# Patient Record
Sex: MALE | URBAN - METROPOLITAN AREA
[De-identification: names, ages, dates, MRNs, and addresses within clinical notes are randomized per-mention and may not be internally consistent; named-entity substitution may affect disease eponyms.]

---

## 2019-06-10 ENCOUNTER — INPATIENT (INPATIENT)
Facility: HOSPITAL | Age: 54
LOS: 1 days | Discharge: HOME | End: 2019-06-12
Attending: INTERNAL MEDICINE | Admitting: INTERNAL MEDICINE
Payer: MEDICAID

## 2019-06-10 VITALS
DIASTOLIC BLOOD PRESSURE: 63 MMHG | HEART RATE: 80 BPM | TEMPERATURE: 98 F | OXYGEN SATURATION: 100 % | RESPIRATION RATE: 18 BRPM | SYSTOLIC BLOOD PRESSURE: 146 MMHG

## 2019-06-10 LAB
ALBUMIN SERPL ELPH-MCNC: 4.8 G/DL — SIGNIFICANT CHANGE UP (ref 3.3–5)
ALP SERPL-CCNC: 81 U/L — SIGNIFICANT CHANGE UP (ref 40–120)
ALT FLD-CCNC: 22 U/L — SIGNIFICANT CHANGE UP (ref 4–41)
ANION GAP SERPL CALC-SCNC: 12 MMO/L — SIGNIFICANT CHANGE UP (ref 7–14)
APTT BLD: 33.2 SEC — SIGNIFICANT CHANGE UP (ref 27.5–36.3)
AST SERPL-CCNC: 18 U/L — SIGNIFICANT CHANGE UP (ref 4–40)
BILIRUB SERPL-MCNC: 0.2 MG/DL — SIGNIFICANT CHANGE UP (ref 0.2–1.2)
BUN SERPL-MCNC: 12 MG/DL — SIGNIFICANT CHANGE UP (ref 7–23)
CALCIUM SERPL-MCNC: 9.8 MG/DL — SIGNIFICANT CHANGE UP (ref 8.4–10.5)
CHLORIDE SERPL-SCNC: 104 MMOL/L — SIGNIFICANT CHANGE UP (ref 98–107)
CO2 SERPL-SCNC: 26 MMOL/L — SIGNIFICANT CHANGE UP (ref 22–31)
CREAT SERPL-MCNC: 1.04 MG/DL — SIGNIFICANT CHANGE UP (ref 0.5–1.3)
GLUCOSE SERPL-MCNC: 101 MG/DL — HIGH (ref 70–99)
HCT VFR BLD CALC: 39.2 % — SIGNIFICANT CHANGE UP (ref 39–50)
HGB BLD-MCNC: 12.5 G/DL — LOW (ref 13–17)
INR BLD: 0.94 — SIGNIFICANT CHANGE UP (ref 0.88–1.17)
MCHC RBC-ENTMCNC: 29.1 PG — SIGNIFICANT CHANGE UP (ref 27–34)
MCHC RBC-ENTMCNC: 31.9 % — LOW (ref 32–36)
MCV RBC AUTO: 91.2 FL — SIGNIFICANT CHANGE UP (ref 80–100)
NRBC # FLD: 0 K/UL — SIGNIFICANT CHANGE UP (ref 0–0)
PLATELET # BLD AUTO: 296 K/UL — SIGNIFICANT CHANGE UP (ref 150–400)
PMV BLD: 10.9 FL — SIGNIFICANT CHANGE UP (ref 7–13)
POTASSIUM SERPL-MCNC: 4.1 MMOL/L — SIGNIFICANT CHANGE UP (ref 3.5–5.3)
POTASSIUM SERPL-SCNC: 4.1 MMOL/L — SIGNIFICANT CHANGE UP (ref 3.5–5.3)
PROT SERPL-MCNC: 7.7 G/DL — SIGNIFICANT CHANGE UP (ref 6–8.3)
PROTHROM AB SERPL-ACNC: 10.7 SEC — SIGNIFICANT CHANGE UP (ref 9.8–13.1)
RBC # BLD: 4.3 M/UL — SIGNIFICANT CHANGE UP (ref 4.2–5.8)
RBC # FLD: 13.4 % — SIGNIFICANT CHANGE UP (ref 10.3–14.5)
SODIUM SERPL-SCNC: 142 MMOL/L — SIGNIFICANT CHANGE UP (ref 135–145)
TROPONIN T, HIGH SENSITIVITY: 11 NG/L — SIGNIFICANT CHANGE UP (ref ?–14)
WBC # BLD: 12.14 K/UL — HIGH (ref 3.8–10.5)
WBC # FLD AUTO: 12.14 K/UL — HIGH (ref 3.8–10.5)

## 2019-06-10 PROCEDURE — 71045 X-RAY EXAM CHEST 1 VIEW: CPT | Mod: 26

## 2019-06-10 RX ORDER — FAMOTIDINE 10 MG/ML
20 INJECTION INTRAVENOUS ONCE
Refills: 0 | Status: COMPLETED | OUTPATIENT
Start: 2019-06-10 | End: 2019-06-10

## 2019-06-10 RX ORDER — ONDANSETRON 8 MG/1
4 TABLET, FILM COATED ORAL ONCE
Refills: 0 | Status: COMPLETED | OUTPATIENT
Start: 2019-06-10 | End: 2019-06-10

## 2019-06-10 RX ORDER — ASPIRIN/CALCIUM CARB/MAGNESIUM 324 MG
162 TABLET ORAL ONCE
Refills: 0 | Status: COMPLETED | OUTPATIENT
Start: 2019-06-10 | End: 2019-06-10

## 2019-06-10 RX ORDER — SIMETHICONE 80 MG/1
80 TABLET, CHEWABLE ORAL ONCE
Refills: 0 | Status: COMPLETED | OUTPATIENT
Start: 2019-06-10 | End: 2019-06-10

## 2019-06-10 RX ADMIN — Medication 162 MILLIGRAM(S): at 21:53

## 2019-06-10 RX ADMIN — ONDANSETRON 4 MILLIGRAM(S): 8 TABLET, FILM COATED ORAL at 21:53

## 2019-06-10 RX ADMIN — FAMOTIDINE 20 MILLIGRAM(S): 10 INJECTION INTRAVENOUS at 21:56

## 2019-06-10 RX ADMIN — SIMETHICONE 80 MILLIGRAM(S): 80 TABLET, CHEWABLE ORAL at 21:56

## 2019-06-10 RX ADMIN — Medication 30 MILLILITER(S): at 21:56

## 2019-06-10 NOTE — ED PROVIDER NOTE - OBJECTIVE STATEMENT
54 M w/ HTN, T2DM, and HLD p/w substernal/L sided chest pain that started 2 hours ago. Pain is burning in nature, not associated w/ meals. Unclear if worse w/ exertion, no radiation. Had similar pain >15 years ago and got medical check up and was told his heart is fine. Currently reports pain is 7/10. No SOB, cough, palpitations, vomiting, diarrhea, or constipation. No recent travel. Active smoker, 2-3 cigs/day for ~40 years. 54 yr old M w/ HTN, T2DM, and HLD p/w substernal/L sided chest pain that started 2 hours ago. Pain is burning in nature, not associated w/ meals. Unclear if worse w/ exertion, no radiation. Had similar pain >15 years ago and got medical check up and was told his heart is fine. Currently reports pain is 7/10. No SOB, cough, palpitations, vomiting, diarrhea, or constipation. No recent travel. Active smoker, 2-3 cigs/day for ~40 years.  Well appearing.

## 2019-06-10 NOTE — ED PROVIDER NOTE - NS ED ROS FT
CONSTITUTIONAL:  No fever, chills, weakness or fatigue.  HEENT:  No rhinorrhea  SKIN:  No rash or itching.  CARDIOVASCULAR:  +chest pain  RESPIRATORY:  No shortness of breath, cough or sputum.  GASTROINTESTINAL:  No nausea, vomiting or diarrhea. No abdominal pain.   GENITOURINARY:  Denies hematuria, dysuria.   NEUROLOGICAL:  No headache, dizziness, syncope.   MUSCULOSKELETAL:  No muscle, back pain, joint pain or stiffness.  HEMATOLOGIC:  No bleeding or bruising.

## 2019-06-10 NOTE — ED ADULT NURSE NOTE - OBJECTIVE STATEMENT
Pt received to rm 21 aaox4 ambulatory c/o lesft sided chest pain x 2 hrs Pt denies SOB numbness tingling left arm pain.  Pt p/w NAD breatsh even unlabored skin warm dry intact 20 g IV access obtained at . labs and meds as ordered.  Will monitor.

## 2019-06-10 NOTE — ED PROVIDER NOTE - CLINICAL SUMMARY MEDICAL DECISION MAKING FREE TEXT BOX
54M w/ cardiac risk factors p/w atypical chest pain. EKG NSR, no ST/T wave changes. Will send labs, may need admission for stress test as he does not have cardiologist. 54M, smoker, w/ cardiac risk factors p/w atypical chest pain. EKG NSR, no ST/T wave changes. Will send labs, may need require CDU for stress and further eval as pt has limited f/u.

## 2019-06-10 NOTE — ED ADULT NURSE NOTE - NSIMPLEMENTINTERV_GEN_ALL_ED
Implemented All Universal Safety Interventions:  [place] to call system. Call bell, personal items and telephone within reach. Instruct patient to call for assistance. Room bathroom lighting operational. Non-slip footwear when patient is off stretcher. Physically safe environment: no spills, clutter or unnecessary equipment. Stretcher in lowest position, wheels locked, appropriate side rails in place.

## 2019-06-10 NOTE — ED PROVIDER NOTE - ATTENDING CONTRIBUTION TO CARE
Attending Attestation: Dr. Preston  I have personally performed a history and physical examination of the patient and discussed management with the resident as well as the patient.  I reviewed the resident's note and agree with the documented findings and plan of care.  I have authored and modified critical sections of the Provider Note, including but not limited to HPI, Physical Exam and MDM. 54M, smoker, w/ cardiac risk factors p/w atypical chest pain. EKG NSR, no ST/T wave changes. Will send labs, may need require CDU for stress and further eval as pt has limited f/u.

## 2019-06-10 NOTE — ED PROVIDER NOTE - PHYSICAL EXAMINATION
GENERAL APPEARANCE: NAD  HEENT:  NC/AT, clear conjunctiva  NECK: Neck supple, non-tender without lymphadenopathy, masses  CARDIAC: Normal S1 and S2. No S3, S4 or murmurs. Rhythm is regular.  LUNGS: Clear to auscultation and percussion without rales, rhonchi, wheezing or diminished breath sounds.  ABDOMEN: Soft, nondistended, nontender. No guarding or rebound.   MUSKULOSKELETAL: No joint erythema or tenderness.   EXTREMITIES: No edema.  NEUROLOGICAL: No focal neurologic deficit.   SKIN: Skin clean, dry, intact  PSYCHIATRIC: Normal affect and behavior.

## 2019-06-11 DIAGNOSIS — E78.5 HYPERLIPIDEMIA, UNSPECIFIED: ICD-10-CM

## 2019-06-11 DIAGNOSIS — F17.210 NICOTINE DEPENDENCE, CIGARETTES, UNCOMPLICATED: ICD-10-CM

## 2019-06-11 DIAGNOSIS — I10 ESSENTIAL (PRIMARY) HYPERTENSION: ICD-10-CM

## 2019-06-11 DIAGNOSIS — E11.9 TYPE 2 DIABETES MELLITUS WITHOUT COMPLICATIONS: ICD-10-CM

## 2019-06-11 DIAGNOSIS — R07.9 CHEST PAIN, UNSPECIFIED: ICD-10-CM

## 2019-06-11 DIAGNOSIS — R94.39 ABNORMAL RESULT OF OTHER CARDIOVASCULAR FUNCTION STUDY: ICD-10-CM

## 2019-06-11 DIAGNOSIS — I25.9 CHRONIC ISCHEMIC HEART DISEASE, UNSPECIFIED: ICD-10-CM

## 2019-06-11 DIAGNOSIS — Z29.9 ENCOUNTER FOR PROPHYLACTIC MEASURES, UNSPECIFIED: ICD-10-CM

## 2019-06-11 LAB
CHOLEST SERPL-MCNC: 95 MG/DL — LOW (ref 120–199)
HDLC SERPL-MCNC: 33 MG/DL — LOW (ref 35–55)
LIPID PNL WITH DIRECT LDL SERPL: 54 MG/DL — SIGNIFICANT CHANGE UP
TRIGL SERPL-MCNC: 129 MG/DL — SIGNIFICANT CHANGE UP (ref 10–149)
TROPONIN T, HIGH SENSITIVITY: 12 NG/L — SIGNIFICANT CHANGE UP (ref ?–14)

## 2019-06-11 PROCEDURE — 93016 CV STRESS TEST SUPVJ ONLY: CPT | Mod: GC

## 2019-06-11 PROCEDURE — 78452 HT MUSCLE IMAGE SPECT MULT: CPT | Mod: 26

## 2019-06-11 PROCEDURE — 93018 CV STRESS TEST I&R ONLY: CPT | Mod: GC

## 2019-06-11 RX ORDER — ASPIRIN/CALCIUM CARB/MAGNESIUM 324 MG
81 TABLET ORAL DAILY
Refills: 0 | Status: DISCONTINUED | OUTPATIENT
Start: 2019-06-11 | End: 2019-06-12

## 2019-06-11 RX ORDER — SODIUM CHLORIDE 9 MG/ML
1000 INJECTION, SOLUTION INTRAVENOUS
Refills: 0 | Status: DISCONTINUED | OUTPATIENT
Start: 2019-06-11 | End: 2019-06-12

## 2019-06-11 RX ORDER — HEPARIN SODIUM 5000 [USP'U]/ML
5000 INJECTION INTRAVENOUS; SUBCUTANEOUS EVERY 12 HOURS
Refills: 0 | Status: DISCONTINUED | OUTPATIENT
Start: 2019-06-11 | End: 2019-06-12

## 2019-06-11 RX ORDER — NICOTINE POLACRILEX 2 MG
1 GUM BUCCAL DAILY
Refills: 0 | Status: DISCONTINUED | OUTPATIENT
Start: 2019-06-11 | End: 2019-06-12

## 2019-06-11 RX ORDER — METFORMIN HYDROCHLORIDE 850 MG/1
1 TABLET ORAL
Qty: 0 | Refills: 0 | DISCHARGE

## 2019-06-11 RX ORDER — DEXTROSE 50 % IN WATER 50 %
15 SYRINGE (ML) INTRAVENOUS ONCE
Refills: 0 | Status: DISCONTINUED | OUTPATIENT
Start: 2019-06-11 | End: 2019-06-12

## 2019-06-11 RX ORDER — DEXTROSE 50 % IN WATER 50 %
12.5 SYRINGE (ML) INTRAVENOUS ONCE
Refills: 0 | Status: DISCONTINUED | OUTPATIENT
Start: 2019-06-11 | End: 2019-06-12

## 2019-06-11 RX ORDER — GLUCAGON INJECTION, SOLUTION 0.5 MG/.1ML
1 INJECTION, SOLUTION SUBCUTANEOUS ONCE
Refills: 0 | Status: DISCONTINUED | OUTPATIENT
Start: 2019-06-11 | End: 2019-06-12

## 2019-06-11 RX ORDER — DEXTROSE 50 % IN WATER 50 %
25 SYRINGE (ML) INTRAVENOUS ONCE
Refills: 0 | Status: DISCONTINUED | OUTPATIENT
Start: 2019-06-11 | End: 2019-06-12

## 2019-06-11 RX ORDER — FENOFIBRATE,MICRONIZED 130 MG
1 CAPSULE ORAL
Qty: 0 | Refills: 0 | DISCHARGE

## 2019-06-11 RX ORDER — INSULIN LISPRO 100/ML
VIAL (ML) SUBCUTANEOUS AT BEDTIME
Refills: 0 | Status: DISCONTINUED | OUTPATIENT
Start: 2019-06-11 | End: 2019-06-12

## 2019-06-11 RX ORDER — ASPIRIN/CALCIUM CARB/MAGNESIUM 324 MG
1 TABLET ORAL
Qty: 0 | Refills: 0 | DISCHARGE

## 2019-06-11 RX ORDER — METFORMIN HYDROCHLORIDE 850 MG/1
1000 TABLET ORAL
Refills: 0 | Status: DISCONTINUED | OUTPATIENT
Start: 2019-06-11 | End: 2019-06-11

## 2019-06-11 RX ORDER — ATORVASTATIN CALCIUM 80 MG/1
1 TABLET, FILM COATED ORAL
Qty: 0 | Refills: 0 | DISCHARGE

## 2019-06-11 RX ORDER — INSULIN LISPRO 100/ML
VIAL (ML) SUBCUTANEOUS
Refills: 0 | Status: DISCONTINUED | OUTPATIENT
Start: 2019-06-11 | End: 2019-06-12

## 2019-06-11 RX ORDER — ATORVASTATIN CALCIUM 80 MG/1
40 TABLET, FILM COATED ORAL
Refills: 0 | Status: DISCONTINUED | OUTPATIENT
Start: 2019-06-11 | End: 2019-06-12

## 2019-06-11 RX ORDER — ERGOCALCIFEROL 1.25 MG/1
1 CAPSULE ORAL
Qty: 0 | Refills: 0 | DISCHARGE

## 2019-06-11 RX ORDER — FENOFIBRATE,MICRONIZED 130 MG
48 CAPSULE ORAL
Refills: 0 | Status: DISCONTINUED | OUTPATIENT
Start: 2019-06-11 | End: 2019-06-12

## 2019-06-11 RX ORDER — LOSARTAN POTASSIUM 100 MG/1
50 TABLET, FILM COATED ORAL
Refills: 0 | Status: DISCONTINUED | OUTPATIENT
Start: 2019-06-11 | End: 2019-06-12

## 2019-06-11 RX ADMIN — METFORMIN HYDROCHLORIDE 1000 MILLIGRAM(S): 850 TABLET ORAL at 18:35

## 2019-06-11 RX ADMIN — LOSARTAN POTASSIUM 50 MILLIGRAM(S): 100 TABLET, FILM COATED ORAL at 18:35

## 2019-06-11 RX ADMIN — Medication 81 MILLIGRAM(S): at 12:49

## 2019-06-11 RX ADMIN — ATORVASTATIN CALCIUM 40 MILLIGRAM(S): 80 TABLET, FILM COATED ORAL at 18:35

## 2019-06-11 RX ADMIN — METFORMIN HYDROCHLORIDE 1000 MILLIGRAM(S): 850 TABLET ORAL at 09:31

## 2019-06-11 RX ADMIN — Medication 48 MILLIGRAM(S): at 18:35

## 2019-06-11 NOTE — H&P ADULT - HISTORY OF PRESENT ILLNESS
53 y/o M with hx of HTN, DM, HLD presents with chest pain which occurred yesterday. patient states he developed L sided, constant non radiating chest pain which lasted for 6-7 hour prior to arriving to ED with associated nausea, but no vomiting. Patient received Maalox Pepcid and Zofran in ED and pain improved s/p medications. He was then kept in CDU and underwent stress test, which was positive. He was admitted for further eval. Denies fever, chills, cough, falls, LOC, abdominal pain, SOB, nausea, vomiting, melena, hematochezia, LE edema or dysuria.

## 2019-06-11 NOTE — H&P ADULT - ASSESSMENT
53 y/o M with hx of HTN, DM, HLD presents with chest pain which occurred yesterday admitted for abnormal stress test

## 2019-06-11 NOTE — H&P ADULT - ATTENDING COMMENTS
Pt. seen and examined at bedside; agree with above     EKG NSR    NST: < from: Nuclear Stress Test-Exercise (06.11.19 @ 08:57) >    * Myocardial Perfusion SPECT results are abnormal.  * Review of raw data shows: The study is of good technical  quality.  * The left ventricle was normal in size. There is a small,  mild defect in basal anteroseptal wall that is reversible,  suggestive of mild ischemia.  * Post-stress gated wall motion analysis was performed  (LVEF = 60 %;LVEDV = 83 ml.), revealing normal LV  function. There was no segmental wall motion abnormality.  RV function appeared normal.    < end of copied text >    Assessment and Plan     1) CP: Stress test abnormal , will plan for angiogram, npo after midnight, c/w asa lipitor     2) HTN: decrease losartan to 25 mg po daily and start metoprolol XL 25mg po daily      3) DM: C/W insulin     4) DVT PPX Heparin

## 2019-06-11 NOTE — ED CDU PROVIDER INITIAL DAY NOTE - ATTENDING CONTRIBUTION TO CARE
ED Attending (Dr Preston): I have personally performed a face to face bedside history and physical examination of this patient.  I have discussed the case with the PA and  I have personally authored the following components: HPI, ROS, Physical Exam and MDM in addition to reviewing and revising the rest of the Provider Note. Chest pain in pt c moderate risk for MACE. Tele monitoring, stress test, general observation care / monitoring.

## 2019-06-11 NOTE — ED CDU PROVIDER DISPOSITION NOTE - CLINICAL COURSE
CDU Att Admit Note: Mohan  Pt p/w chest pain, today found to have abnormal stress test.  Admitted at [facility] of cardiology for further eval.  Pain is better controlled now and pt is well appearing, ambulatory.  All results and plan d/w pt via family and also via [facility]  (by LOR Li).   I have personally performed a face to face evaluation of this patient including review of the history and focused physical exam.  I have discussed the case and reviewed the plan of care with the PA.

## 2019-06-11 NOTE — H&P ADULT - NSICDXPASTMEDICALHX_GEN_ALL_CORE_FT
PAST MEDICAL HISTORY:  Cigarette smoker     Diabetes mellitus     Essential hypertension     Hyperlipidemia

## 2019-06-11 NOTE — H&P ADULT - PROBLEM SELECTOR PLAN 1
Admit to tele  check cbc, bmp, a1c, flp, tsh, trend CE  Echo ordered  NPO for cath in AM with Dr. Shaffer Admit to tele  check cbc, bmp, a1c, flp, tsh, trend CE  Echo ordered  NPO for cath in AM with Dr. Shaffer  Discussed with Dr. Shaffer

## 2019-06-11 NOTE — ED CDU PROVIDER INITIAL DAY NOTE - MEDICAL DECISION MAKING DETAILS
Tele monitoring, stress test, general observation care / monitoring. Chest pain in pt c moderate risk for MACE. Tele monitoring, stress test, general observation care / monitoring.

## 2019-06-11 NOTE — H&P ADULT - PROBLEM SELECTOR PLAN 2
Admit to tele  check cbc, bmp, a1c, flp, tsh, trend CE  Echo ordered  NPO for cath in AM with Dr. Denea obrien asa

## 2019-06-11 NOTE — ED CDU PROVIDER INITIAL DAY NOTE - OBJECTIVE STATEMENT
54 yr old M w/ HTN, T2DM, and HLD p/w substernal/L sided chest pain that started 2 hours ago. Pain is burning in nature, not associated w/ meals. Unclear if worse w/ exertion, no radiation. Had similar pain >15 years ago and got medical check up and was told his heart is fine. Currently reports pain is 7/10. No SOB, cough, palpitations, vomiting, diarrhea, or constipation. No recent travel. Active smoker, 2-3 cigs/day for ~40 years.  Well appearing.    CDU LOR Roper Note-----  53 yo Occ[place] SPEAKING male, PMH of DM, HTN, hyperlipidemia, active cigarette smoker (2-3 cigarettes per day x 40+ years); presented to the ED for chest pain as per above.  Pt. was evaluated in the ED; EKG x 2 with no acute findings; trop x 2: 11 -----> 12; CXR without acute pathology noted; pt was dispo'd to CDU for continued care plan:  Tele monitoring, stress test, general observation care / monitoring.  On CDU evaluation, H&P aided by [employer]  ID# [id number]; via  pt verbalizes feeling much improved; states no active c/o.  CDU care plan d/w pt; pt verbalizes agreement with plan. 54 yr old M w/ HTN, T2DM, and HLD p/w substernal/L sided chest pain that started 2 prior to arrival in ED. Pain is burning in nature, not associated w/ meals. Unclear if worse w/ exertion, no radiation. Had similar pain >15 years ago and got medical check up and was told his heart is fine. Currently reports pain is 7/10. No SOB, cough, palpitations, vomiting, diarrhea, or constipation. No recent travel. Active smoker, 2-3 cigs/day for ~40 years.  Well appearing.  In ED no source found and sent ot CDU for stress test and tele monitoring as pt would have some troubel obtaining outpt eval.     CDU LOR Roper Note-----  55 yo [place] SPEAKING male, PMH of DM, HTN, hyperlipidemia, active cigarette smoker (2-3 cigarettes per day x 40+ years); presented to the ED for chest pain as per above.  Pt. was evaluated in the ED; EKG x 2 with no acute findings; trop x 2: 11 -----> 12; CXR without acute pathology noted; pt was dispo'd to CDU for continued care plan:  Tele monitoring, stress test, general observation care / monitoring.  On CDU evaluation, H&P aided by [employer] Ramandeep  ID# [id number]; via  pt verbalizes feeling much improved; states no active c/o.  CDU care plan d/w pt; pt verbalizes agreement with plan.

## 2019-06-11 NOTE — ED CDU PROVIDER INITIAL DAY NOTE - PROGRESS NOTE DETAILS
Pt objectively noted to be resting comfortably.  Pt. will be signed out to the day CDU PA (Gabi) and attending (Dr. Preston) at 0700 hrs. CDU Att PN: Mohan  Feeling well, had stress this am, results pending.   I have personally performed a face to face evaluation of this patient including review of the history and focused physical exam.  I have discussed the case and reviewed the plan of care with the PA. Received call from Dr. Franz that stress test appears abnormal with mild ischemia noted anteroseptal region. Spoke to Dr. Shaffer regarding results and states to admit patient to his service. Spoke to patient w/  # [id number] regarding test results, recommendation for admission and possible need for cardiac cath. Son then came to bedside and aware of plan. Pt agreeable to stay. Will admit at this time. Tele PA paged.

## 2019-06-11 NOTE — H&P ADULT - NSHPLABSRESULTS_GEN_ALL_CORE
EKG: NSR 78 Flat T in V6, AVL, TWI in AVR. Tall T in v4                          12.5   12.14 )-----------( 296      ( 10 Christian 2019 21:49 )             39.2     06-10    142  |  104  |  12  ----------------------------<  101<H>  4.1   |  26  |  1.04    Ca    9.8      10 Christian 2019 21:49    TPro  7.7  /  Alb  4.8  /  TBili  0.2  /  DBili  x   /  AST  18  /  ALT  22  /  AlkPhos  81  06-10    Troponin T, High Sensitivity: 12: ---------------------***PLEASE NOTE***----------------------  Rapid changes upward or downward in high-sensitivity  troponin levels strongly suggest acute myocardial injury.  Hemolysis may falsely lower results. Renal impairment may  increase results.    Normal: <6 - 14 ng/L  Indeterminate: 15 - 51 ng/L  Elevated: >51 ng/L    Please see "[email or web address]" on the [employer]  intranet for more information. ng/L (06.10.19 @ 23:51)

## 2019-06-12 VITALS
DIASTOLIC BLOOD PRESSURE: 76 MMHG | OXYGEN SATURATION: 100 % | RESPIRATION RATE: 16 BRPM | HEART RATE: 70 BPM | SYSTOLIC BLOOD PRESSURE: 116 MMHG | TEMPERATURE: 98 F

## 2019-06-12 LAB
ANION GAP SERPL CALC-SCNC: 14 MMO/L — SIGNIFICANT CHANGE UP (ref 7–14)
BASOPHILS # BLD AUTO: 0.08 K/UL — SIGNIFICANT CHANGE UP (ref 0–0.2)
BASOPHILS NFR BLD AUTO: 0.9 % — SIGNIFICANT CHANGE UP (ref 0–2)
BUN SERPL-MCNC: 14 MG/DL — SIGNIFICANT CHANGE UP (ref 7–23)
CALCIUM SERPL-MCNC: 9.1 MG/DL — SIGNIFICANT CHANGE UP (ref 8.4–10.5)
CHLORIDE SERPL-SCNC: 104 MMOL/L — SIGNIFICANT CHANGE UP (ref 98–107)
CO2 SERPL-SCNC: 22 MMOL/L — SIGNIFICANT CHANGE UP (ref 22–31)
CREAT SERPL-MCNC: 0.98 MG/DL — SIGNIFICANT CHANGE UP (ref 0.5–1.3)
EOSINOPHIL # BLD AUTO: 0.49 K/UL — SIGNIFICANT CHANGE UP (ref 0–0.5)
EOSINOPHIL NFR BLD AUTO: 5.6 % — SIGNIFICANT CHANGE UP (ref 0–6)
GLUCOSE SERPL-MCNC: 129 MG/DL — HIGH (ref 70–99)
HCT VFR BLD CALC: 43.3 % — SIGNIFICANT CHANGE UP (ref 39–50)
HGB BLD-MCNC: 13.9 G/DL — SIGNIFICANT CHANGE UP (ref 13–17)
IMM GRANULOCYTES NFR BLD AUTO: 0.3 % — SIGNIFICANT CHANGE UP (ref 0–1.5)
LYMPHOCYTES # BLD AUTO: 2.89 K/UL — SIGNIFICANT CHANGE UP (ref 1–3.3)
LYMPHOCYTES # BLD AUTO: 33.3 % — SIGNIFICANT CHANGE UP (ref 13–44)
MAGNESIUM SERPL-MCNC: 1.9 MG/DL — SIGNIFICANT CHANGE UP (ref 1.6–2.6)
MCHC RBC-ENTMCNC: 29.5 PG — SIGNIFICANT CHANGE UP (ref 27–34)
MCHC RBC-ENTMCNC: 32.1 % — SIGNIFICANT CHANGE UP (ref 32–36)
MCV RBC AUTO: 91.9 FL — SIGNIFICANT CHANGE UP (ref 80–100)
MONOCYTES # BLD AUTO: 0.6 K/UL — SIGNIFICANT CHANGE UP (ref 0–0.9)
MONOCYTES NFR BLD AUTO: 6.9 % — SIGNIFICANT CHANGE UP (ref 2–14)
NEUTROPHILS # BLD AUTO: 4.59 K/UL — SIGNIFICANT CHANGE UP (ref 1.8–7.4)
NEUTROPHILS NFR BLD AUTO: 53 % — SIGNIFICANT CHANGE UP (ref 43–77)
NRBC # FLD: 0 K/UL — SIGNIFICANT CHANGE UP (ref 0–0)
PHOSPHATE SERPL-MCNC: 2.7 MG/DL — SIGNIFICANT CHANGE UP (ref 2.5–4.5)
PLATELET # BLD AUTO: 289 K/UL — SIGNIFICANT CHANGE UP (ref 150–400)
PMV BLD: 10.7 FL — SIGNIFICANT CHANGE UP (ref 7–13)
POTASSIUM SERPL-MCNC: 4.1 MMOL/L — SIGNIFICANT CHANGE UP (ref 3.5–5.3)
POTASSIUM SERPL-SCNC: 4.1 MMOL/L — SIGNIFICANT CHANGE UP (ref 3.5–5.3)
RBC # BLD: 4.71 M/UL — SIGNIFICANT CHANGE UP (ref 4.2–5.8)
RBC # FLD: 13.2 % — SIGNIFICANT CHANGE UP (ref 10.3–14.5)
SODIUM SERPL-SCNC: 140 MMOL/L — SIGNIFICANT CHANGE UP (ref 135–145)
WBC # BLD: 8.68 K/UL — SIGNIFICANT CHANGE UP (ref 3.8–10.5)
WBC # FLD AUTO: 8.68 K/UL — SIGNIFICANT CHANGE UP (ref 3.8–10.5)

## 2019-06-12 RX ORDER — LOSARTAN POTASSIUM 100 MG/1
1 TABLET, FILM COATED ORAL
Qty: 30 | Refills: 0
Start: 2019-06-12 | End: 2019-07-11

## 2019-06-12 RX ORDER — METOPROLOL TARTRATE 50 MG
1 TABLET ORAL
Qty: 30 | Refills: 0
Start: 2019-06-12 | End: 2019-07-11

## 2019-06-12 RX ORDER — LOSARTAN POTASSIUM 100 MG/1
1 TABLET, FILM COATED ORAL
Qty: 0 | Refills: 0 | DISCHARGE

## 2019-06-12 RX ORDER — SODIUM CHLORIDE 9 MG/ML
500 INJECTION INTRAMUSCULAR; INTRAVENOUS; SUBCUTANEOUS
Refills: 0 | Status: DISCONTINUED | OUTPATIENT
Start: 2019-06-12 | End: 2019-06-12

## 2019-06-12 RX ORDER — METOPROLOL TARTRATE 50 MG
25 TABLET ORAL DAILY
Refills: 0 | Status: DISCONTINUED | OUTPATIENT
Start: 2019-06-12 | End: 2019-06-12

## 2019-06-12 RX ORDER — LOSARTAN POTASSIUM 100 MG/1
25 TABLET, FILM COATED ORAL
Refills: 0 | Status: DISCONTINUED | OUTPATIENT
Start: 2019-06-12 | End: 2019-06-12

## 2019-06-12 RX ADMIN — Medication 81 MILLIGRAM(S): at 11:25

## 2019-06-12 RX ADMIN — HEPARIN SODIUM 5000 UNIT(S): 5000 INJECTION INTRAVENOUS; SUBCUTANEOUS at 07:39

## 2019-06-12 NOTE — DISCHARGE NOTE PROVIDER - CARE PROVIDER_API CALL
Steven Shaffer (MD)  Cardiovascular Disease; Nuclear Cardiology  [address] [place] [address]  Phone: [phone]  Fax: [phone]  Follow Up Time: Steven Shaffer (MD)  Cardiovascular Disease; Internal Medicine  [address] [place] [address]  Phone: [phone]  Fax: [phone]  Follow Up Time:

## 2019-06-12 NOTE — DISCHARGE NOTE PROVIDER - HOSPITAL COURSE
55 y/o M with hx of HTN, DM, HLD presents with chest pain which occurred yesterday admitted for abnormal stress test         Problem/Plan - 1:    ·  Problem: Abnormal stress test.  Plan: Admit to tele    check cbc, bmp, a1c, flp, tsh, trend CE    Echo ordered    NPO for cath in AM with Dr. Shaffer    Discussed with Dr. Shaffer.          Problem/Plan - 2:    ·  Problem: Chest pain.  Plan: Admit to tele    check cbc, bmp, a1c, flp, tsh, trend CE    Echo ordered    NPO for cath in AM with Dr. Shaffer    cont asa.          Problem/Plan - 3:    ·  Problem: Essential hypertension.  Plan: cont losartan    dash diet.          Problem/Plan - 4:    ·  Problem: Cigarette smoker.  Plan: advised importance of smoking cessation    Nicotine patch.          Problem/Plan - 5:    ·  Problem: Diabetes mellitus, type 2.  Plan: ISS    monitor fingersticks    A1C    hold oral hypoglycemics.          Problem/Plan - 6:    Problem: Hyperlipidemia. Plan: cont  statin.         Problem/Plan - 7:    ·  Problem: Need for prophylactic measure.  Plan: hep sc.              Attending Statement:    Pt. seen and examined at bedside; agree with above         EKG NSR        NST: < from: Nuclear Stress Test-Exercise (06.11.19 @ 08:57) >        * Myocardial Perfusion SPECT results are abnormal.    * Review of raw data shows: The study is of good technical    quality.    * The left ventricle was normal in size. There is a small,    mild defect in basal anteroseptal wall that is reversible,    suggestive of mild ischemia.    * Post-stress gated wall motion analysis was performed    (LVEF = 60 %;LVEDV = 83 ml.), revealing normal LV    function. There was no segmental wall motion abnormality.    RV function appeared normal.        < end of copied text >        Assessment and Plan         1) CP: Stress test abnormal , will plan for angiogram, npo after midnight, c/w asa lipitor         2) HTN: decrease losartan to 25 mg po daily and start metoprolol XL 25mg po daily          3) DM: C/W insulin         4) DVT PPX Heparin . 53 y/o M with hx of HTN, DM, HLD presents with chest pain which occurred yesterday admitted for abnormal stress test         Problem/Plan - 1:    ·  Problem: Abnormal stress test.  Plan: Admit to tele    check cbc, bmp, a1c, flp, tsh, trend CE    Echo ordered    NPO for cath in AM with Dr. Shaffer    Discussed with Dr. Shaffer.          Problem/Plan - 2:    ·  Problem: Chest pain.  Plan: Admit to tele    check cbc, bmp, a1c, flp, tsh, trend CE    Echo ordered    NPO for cath in AM with Dr. Shaffer    cont asa.          Problem/Plan - 3:    ·  Problem: Essential hypertension.  Plan: cont losartan    dash diet.          Problem/Plan - 4:    ·  Problem: Cigarette smoker.  Plan: advised importance of smoking cessation    Nicotine patch.          Problem/Plan - 5:    ·  Problem: Diabetes mellitus, type 2.  Plan: ISS    monitor fingersticks    A1C    hold oral hypoglycemics.          Problem/Plan - 6:    Problem: Hyperlipidemia. Plan: cont  statin.         Problem/Plan - 7:    ·  Problem: Need for prophylactic measure.  Plan: hep sc.              Attending Statement:    Pt. seen and examined at bedside; agree with above         EKG NSR        NST: < from: Nuclear Stress Test-Exercise (06.11.19 @ 08:57) >        * Myocardial Perfusion SPECT results are abnormal.    * Review of raw data shows: The study is of good technical    quality.    * The left ventricle was normal in size. There is a small,    mild defect in basal anteroseptal wall that is reversible,    suggestive of mild ischemia.    * Post-stress gated wall motion analysis was performed    (LVEF = 60 %;LVEDV = 83 ml.), revealing normal LV    function. There was no segmental wall motion abnormality.    RV function appeared normal.        < end of copied text >        Assessment and Plan         1) CP: Stress test abnormal , will plan for angiogram, npo after midnight, c/w asa lipitor         2) HTN: decrease losartan to 25 mg po daily and start metoprolol XL 25mg po daily          3) DM: C/W insulin         4) DVT PPX Heparin .             6/12 Cath was negative. Spoke with Deena Rodrigez - patient medically cleared for discharge, stable in cath lab. will  HTN RX at [facility] pharmacy

## 2019-06-12 NOTE — DISCHARGE NOTE PROVIDER - NSDCCPCAREPLAN_GEN_ALL_CORE_FT
PRINCIPAL DISCHARGE DIAGNOSIS  Diagnosis: Abnormal stress test  Assessment and Plan of Treatment: s/p cath lab procedure. Continue with aspirin and statin. please follow up with cardiologist Dr. Shaffer upon discharge, call to schedule appointment.      SECONDARY DISCHARGE DIAGNOSES  Diagnosis: Diabetes mellitus  Assessment and Plan of Treatment: continue regimen, follow up with your PCP upon discharge, call to schedule appointment PRINCIPAL DISCHARGE DIAGNOSIS  Diagnosis: Abnormal stress test  Assessment and Plan of Treatment: s/p cath lab procedure. Continue with aspirin and statin. please follow up with cardiologist Dr. Shaffer upon discharge, call to schedule appointment.      SECONDARY DISCHARGE DIAGNOSES  Diagnosis: Essential hypertension  Assessment and Plan of Treatment: Blood pressure medication dosages were changed**** Please  RX at [facility] pharmacy at [facility]. Please follow up with Dr. Shaffer upon discharge, call to schedule appointment    Diagnosis: Diabetes mellitus  Assessment and Plan of Treatment: continue regimen, follow up with your PCP upon discharge, call to schedule appointment

## 2019-06-12 NOTE — DISCHARGE NOTE NURSING/CASE MANAGEMENT/SOCIAL WORK - NSDCDPATPORTLINK_GEN_ALL_CORE
You can access the [facility] Patient Portal, offered by [facility], by registering with the following website: [email or web address]

## 2019-07-01 ENCOUNTER — OUTPATIENT (OUTPATIENT)
Dept: OUTPATIENT SERVICES | Facility: HOSPITAL | Age: 54
LOS: 1 days | End: 2019-07-01
Payer: MEDICAID

## 2019-07-01 PROCEDURE — G9001: CPT

## 2019-07-09 DIAGNOSIS — Z71.89 OTHER SPECIFIED COUNSELING: ICD-10-CM

## 2019-07-09 PROBLEM — E78.5 HYPERLIPIDEMIA, UNSPECIFIED: Chronic | Status: ACTIVE | Noted: 2019-06-11

## 2019-07-09 PROBLEM — I10 ESSENTIAL (PRIMARY) HYPERTENSION: Chronic | Status: ACTIVE | Noted: 2019-06-10

## 2019-07-09 PROBLEM — F17.210 NICOTINE DEPENDENCE, CIGARETTES, UNCOMPLICATED: Chronic | Status: ACTIVE | Noted: 2019-06-11

## 2019-07-09 PROBLEM — E11.9 TYPE 2 DIABETES MELLITUS WITHOUT COMPLICATIONS: Chronic | Status: ACTIVE | Noted: 2019-06-11

## 2023-10-06 NOTE — ED PROVIDER NOTE - NS ED NOTE AC HIGH RISK COUNTRIES
Plan:  -- I would like to start with brain and cervical/thoracic spine imaging.  In the meantime we will get your records from [facility] and  -- Once I review these I will let you know what next steps I recommend.  -- Follow up in clinic when you return to [place] in the spring.   -- Continue to physical therapy in the meantime.   No

## 2024-01-30 NOTE — ED CDU PROVIDER INITIAL DAY NOTE - PSYCHIATRIC, MLM
Alert and oriented to person, place, time/situation. normal mood and affect. no apparent risk to self or others.
Pressure injury stage 2 or greater
